# Patient Record
Sex: FEMALE | Race: WHITE | NOT HISPANIC OR LATINO | URBAN - METROPOLITAN AREA
[De-identification: names, ages, dates, MRNs, and addresses within clinical notes are randomized per-mention and may not be internally consistent; named-entity substitution may affect disease eponyms.]

---

## 2017-09-12 ENCOUNTER — NEW PATIENT (OUTPATIENT)
Dept: URBAN - METROPOLITAN AREA CLINIC 24 | Facility: CLINIC | Age: 57
End: 2017-09-12
Payer: COMMERCIAL

## 2017-09-12 DIAGNOSIS — H47.323 DRUSEN OF OPTIC DISC, BILATERAL: ICD-10-CM

## 2017-09-12 DIAGNOSIS — H25.813 COMBINED FORMS OF AGE-RELATED CATARACT, BILATERAL: ICD-10-CM

## 2017-09-12 PROCEDURE — 92020 GONIOSCOPY: CPT | Performed by: OPTOMETRIST

## 2017-09-12 PROCEDURE — 92004 COMPRE OPH EXAM NEW PT 1/>: CPT | Performed by: OPTOMETRIST

## 2017-09-12 PROCEDURE — 92083 EXTENDED VISUAL FIELD XM: CPT | Performed by: OPTOMETRIST

## 2017-09-12 PROCEDURE — 92133 CPTRZD OPH DX IMG PST SGM ON: CPT | Performed by: OPTOMETRIST

## 2017-09-12 PROCEDURE — 92132 CPTRZD OPH DX IMG ANT SGM: CPT | Performed by: OPTOMETRIST

## 2017-09-12 RX ORDER — DICLOFENAC POTASSIUM 50 MG/1
50 MG TABLET, FILM COATED ORAL
Qty: 0 | Refills: 0 | Status: INACTIVE
Start: 2017-09-12 | End: 2017-11-10

## 2017-09-12 RX ORDER — SIMVASTATIN 10 MG/1
10 MG TABLET, FILM COATED ORAL
Qty: 0 | Refills: 0 | Status: INACTIVE
Start: 2017-09-12 | End: 2017-11-10

## 2017-09-12 RX ORDER — TIMOLOL MALEATE 10 MG/1
10 MG TABLET ORAL
Qty: 0 | Refills: 0 | Status: INACTIVE
Start: 2005-01-12 | End: 2017-08-14

## 2017-09-12 RX ORDER — TRAMADOL HYDROCHLORIDE 50 MG/1
50 MG TABLET, FILM COATED ORAL
Qty: 0 | Refills: 0 | Status: INACTIVE
Start: 2017-09-12 | End: 2017-11-10

## 2017-09-12 RX ORDER — LISINOPRIL 10 MG/1
10 MG TABLET ORAL
Qty: 0 | Refills: 0 | Status: INACTIVE
Start: 2017-09-12 | End: 2017-11-10

## 2017-09-12 ASSESSMENT — VISUAL ACUITY
OS: 20/20
OD: 20/20

## 2017-09-12 ASSESSMENT — KERATOMETRY
OD: 45.34
OS: 45.09

## 2017-09-12 ASSESSMENT — INTRAOCULAR PRESSURE
OS: 16
OD: 16

## 2017-10-11 ENCOUNTER — CONSULT (OUTPATIENT)
Dept: URBAN - METROPOLITAN AREA CLINIC 24 | Facility: CLINIC | Age: 57
End: 2017-10-11
Payer: COMMERCIAL

## 2017-10-11 PROCEDURE — 76514 ECHO EXAM OF EYE THICKNESS: CPT | Performed by: OPHTHALMOLOGY

## 2017-10-11 PROCEDURE — 92014 COMPRE OPH EXAM EST PT 1/>: CPT | Performed by: OPHTHALMOLOGY

## 2017-10-11 PROCEDURE — 92020 GONIOSCOPY: CPT | Performed by: OPHTHALMOLOGY

## 2017-10-11 PROCEDURE — 92250 FUNDUS PHOTOGRAPHY W/I&R: CPT | Performed by: OPHTHALMOLOGY

## 2017-10-11 RX ORDER — PREDNISOLONE ACETATE 10 MG/ML
1 % SUSPENSION/ DROPS OPHTHALMIC
Qty: 1 | Refills: 0 | Status: INACTIVE
Start: 2017-10-11 | End: 2017-11-10

## 2017-10-11 ASSESSMENT — INTRAOCULAR PRESSURE
OS: 23
OD: 24

## 2017-11-10 ENCOUNTER — Encounter (OUTPATIENT)
Dept: URBAN - METROPOLITAN AREA CLINIC 24 | Facility: CLINIC | Age: 57
End: 2017-11-10
Payer: COMMERCIAL

## 2017-11-10 DIAGNOSIS — Z01.818 ENCOUNTER FOR OTHER PREPROCEDURAL EXAMINATION: Primary | ICD-10-CM

## 2017-11-10 DIAGNOSIS — H40.033 ANATOMICAL NARROW ANGLE, BILATERAL: ICD-10-CM

## 2017-11-10 PROCEDURE — 99213 OFFICE O/P EST LOW 20 MIN: CPT | Performed by: NURSE PRACTITIONER

## 2017-11-10 RX ORDER — TIZANIDINE HYDROCHLORIDE 2 MG/1
2 MG CAPSULE ORAL
Qty: 0 | Refills: 0 | Status: ACTIVE
Start: 2017-11-10

## 2017-11-10 RX ORDER — SIMVASTATIN 20 MG/1
20 MG TABLET, FILM COATED ORAL
Qty: 0 | Refills: 0 | Status: INACTIVE
Start: 2017-11-10 | End: 2018-04-04

## 2017-11-10 RX ORDER — NITROFURANTOIN (MONOHYDRATE/MACROCRYSTALS) 75; 25 MG/1; MG/1
100 MG CAPSULE ORAL
Qty: 0 | Refills: 0 | Status: ACTIVE
Start: 2017-11-10

## 2017-11-10 RX ORDER — GABAPENTIN 300 MG/1
300 MG CAPSULE ORAL
Qty: 0 | Refills: 0 | Status: ACTIVE
Start: 2017-11-10

## 2017-11-10 RX ORDER — DICLOFENAC POTASSIUM 50 MG/1
50 MG TABLET, FILM COATED ORAL
Qty: 0 | Refills: 0 | Status: ACTIVE
Start: 2017-11-10

## 2017-11-10 RX ORDER — PREDNISOLONE ACETATE 10 MG/ML
1 % SUSPENSION/ DROPS OPHTHALMIC
Qty: 1 | Refills: 0 | Status: INACTIVE
Start: 2017-11-10 | End: 2017-11-29

## 2017-11-10 RX ORDER — CHOLECALCIFEROL (VITAMIN D3) 50 MCG
CAPSULE ORAL
Qty: 0 | Refills: 0 | Status: ACTIVE
Start: 2017-11-10

## 2017-11-10 RX ORDER — LISINOPRIL 10 MG/1
10 MG TABLET ORAL
Qty: 0 | Refills: 0 | Status: INACTIVE
Start: 2017-11-10 | End: 2018-04-04

## 2017-11-15 ENCOUNTER — SURGERY (OUTPATIENT)
Dept: URBAN - METROPOLITAN AREA SURGERY 12 | Facility: SURGERY | Age: 57
End: 2017-11-15
Payer: COMMERCIAL

## 2017-11-15 PROCEDURE — 66761 REVISION OF IRIS: CPT | Performed by: OPHTHALMOLOGY

## 2017-11-29 ENCOUNTER — SURGERY (OUTPATIENT)
Dept: URBAN - METROPOLITAN AREA SURGERY 12 | Facility: SURGERY | Age: 57
End: 2017-11-29
Payer: COMMERCIAL

## 2017-11-29 PROCEDURE — 66761 REVISION OF IRIS: CPT | Performed by: OPHTHALMOLOGY

## 2017-11-29 RX ORDER — PREDNISOLONE ACETATE 10 MG/ML
1 % SUSPENSION/ DROPS OPHTHALMIC
Qty: 1 | Refills: 0 | Status: INACTIVE
Start: 2017-11-29 | End: 2018-01-02

## 2018-01-02 ENCOUNTER — FOLLOW UP ESTABLISHED (OUTPATIENT)
Dept: URBAN - METROPOLITAN AREA CLINIC 24 | Facility: CLINIC | Age: 58
End: 2018-01-02
Payer: COMMERCIAL

## 2018-01-02 PROCEDURE — 92012 INTRM OPH EXAM EST PATIENT: CPT | Performed by: OPHTHALMOLOGY

## 2018-01-02 RX ORDER — LATANOPROST 50 UG/ML
0.005 % SOLUTION OPHTHALMIC
Qty: 3 | Refills: 3 | Status: INACTIVE
Start: 2018-01-02 | End: 2018-11-26

## 2018-01-02 ASSESSMENT — INTRAOCULAR PRESSURE
OD: 24
OS: 24

## 2018-04-04 ENCOUNTER — FOLLOW UP ESTABLISHED (OUTPATIENT)
Dept: URBAN - METROPOLITAN AREA CLINIC 26 | Facility: CLINIC | Age: 58
End: 2018-04-04
Payer: COMMERCIAL

## 2018-04-04 PROCEDURE — 92012 INTRM OPH EXAM EST PATIENT: CPT | Performed by: OPHTHALMOLOGY

## 2018-04-04 ASSESSMENT — INTRAOCULAR PRESSURE
OD: 14
OS: 14

## 2019-08-05 ENCOUNTER — FOLLOW UP ESTABLISHED (OUTPATIENT)
Dept: URBAN - METROPOLITAN AREA CLINIC 26 | Facility: CLINIC | Age: 59
End: 2019-08-05
Payer: COMMERCIAL

## 2019-08-05 DIAGNOSIS — H43.812 VITREOUS DEGENERATION, LEFT EYE: Primary | ICD-10-CM

## 2019-08-05 PROCEDURE — 92134 CPTRZ OPH DX IMG PST SGM RTA: CPT | Performed by: OPTOMETRIST

## 2019-08-05 PROCEDURE — 92014 COMPRE OPH EXAM EST PT 1/>: CPT | Performed by: OPTOMETRIST

## 2019-08-05 ASSESSMENT — INTRAOCULAR PRESSURE
OS: 15
OD: 15

## 2023-01-06 ENCOUNTER — OFFICE VISIT (OUTPATIENT)
Dept: URBAN - METROPOLITAN AREA CLINIC 26 | Facility: CLINIC | Age: 63
End: 2023-01-06
Payer: COMMERCIAL

## 2023-01-06 DIAGNOSIS — H57.13 BILATERAL OCULAR PAIN: Primary | ICD-10-CM

## 2023-01-06 DIAGNOSIS — H40.033 ANATOMICAL NARROW ANGLE, BILATERAL: ICD-10-CM

## 2023-01-06 PROCEDURE — 99203 OFFICE O/P NEW LOW 30 MIN: CPT | Performed by: OPTOMETRIST

## 2023-01-06 RX ORDER — LATANOPROST 50 UG/ML
0.005 % SOLUTION OPHTHALMIC
Qty: 7.5 | Refills: 3 | Status: ACTIVE
Start: 2023-01-06

## 2023-01-06 ASSESSMENT — INTRAOCULAR PRESSURE
OD: 15
OS: 15

## 2023-01-06 NOTE — IMPRESSION/PLAN
Impression: Anatomical narrow angle, bilateral Plan: s/p lpi OU. 
currently taking latanoprost qhs ou. IOP today 15/15
pt ed of need of consistent f/u to evaluate if latanoprost if effective. rtc 1 month complete exam, vf 24-2 and rnfl oct.

## 2023-01-06 NOTE — IMPRESSION/PLAN
Impression: Bilateral ocular pain: H57.13. Plan: c/o constant pressure around eyes. no ocular etiology observed to account for pt complaint. pt reassurance IOP stable. recommend consult with PCP and potential sinus evaluation 2/2 pressure sensation.

## 2023-02-06 ENCOUNTER — OFFICE VISIT (OUTPATIENT)
Dept: URBAN - METROPOLITAN AREA CLINIC 26 | Facility: CLINIC | Age: 63
End: 2023-02-06
Payer: COMMERCIAL

## 2023-02-06 DIAGNOSIS — H25.813 COMBINED FORMS OF AGE-RELATED CATARACT, BILATERAL: ICD-10-CM

## 2023-02-06 DIAGNOSIS — H40.033 ANATOMICAL NARROW ANGLE, BILATERAL: Primary | ICD-10-CM

## 2023-02-06 DIAGNOSIS — H47.323 DRUSEN OF OPTIC DISC, BILATERAL: ICD-10-CM

## 2023-02-06 DIAGNOSIS — H16.223 KERATOCONJUNCTIVITIS SICCA, BILATERAL: ICD-10-CM

## 2023-02-06 DIAGNOSIS — H43.812 VITREOUS DEGENERATION, LEFT EYE: ICD-10-CM

## 2023-02-06 PROCEDURE — 92083 EXTENDED VISUAL FIELD XM: CPT | Performed by: OPTOMETRIST

## 2023-02-06 PROCEDURE — 92014 COMPRE OPH EXAM EST PT 1/>: CPT | Performed by: OPTOMETRIST

## 2023-02-06 PROCEDURE — 92133 CPTRZD OPH DX IMG PST SGM ON: CPT | Performed by: OPTOMETRIST

## 2023-02-06 ASSESSMENT — INTRAOCULAR PRESSURE
OD: 11
OS: 11

## 2023-02-06 ASSESSMENT — KERATOMETRY
OD: 45.50
OS: 45.00

## 2023-02-06 ASSESSMENT — VISUAL ACUITY
OS: 20/20
OD: 20/20

## 2023-02-06 NOTE — IMPRESSION/PLAN
Impression: Drusen of optic disc, bilateral
-- Pt referred Loni Aguilera, OD for optic nerve eval
-- see rnfl oct Plan: questionable change to vf 2/2 glaucomatous changes vs disc drusen. observe.

## 2023-02-06 NOTE — IMPRESSION/PLAN
Impression: Keratoconjunctivitis sicca, bilateral: C23.255. Plan: Recommend artificial tears at least 4 times a day and gel drop or tear ointment at bedtime, Omega 3 fatty acids (2-3,000 mg) daily. drink plenty water. Avoid overhead fans at bedtime.

## 2023-02-06 NOTE — IMPRESSION/PLAN
Impression: Anatomical narrow angle, bilateral
--(+) optic disc drusen OU Plan: s/p lpi OU. 
currently taking latanoprost qhs ou.
today's IOP: 11/11 rnfl oct (02/06/23): od;superior and inferior borderline os: inferior thinning
vf 24-2 (0/06/23) od: superior nasal defect os: superior and inferior arcuate (glaucomatous progression vs. disc drusen?) IOP well controlled with current medication.  
continue latanoprost qhs OU. 
rtc 6 months IOP check

## 2023-08-07 ENCOUNTER — OFFICE VISIT (OUTPATIENT)
Dept: URBAN - METROPOLITAN AREA CLINIC 26 | Facility: CLINIC | Age: 63
End: 2023-08-07
Payer: COMMERCIAL

## 2023-08-07 DIAGNOSIS — H40.033 ANATOMICAL NARROW ANGLE, BILATERAL: Primary | ICD-10-CM

## 2023-08-07 PROCEDURE — 99213 OFFICE O/P EST LOW 20 MIN: CPT | Performed by: OPTOMETRIST

## 2023-08-07 ASSESSMENT — INTRAOCULAR PRESSURE
OD: 11
OS: 11

## 2024-02-07 ENCOUNTER — OFFICE VISIT (OUTPATIENT)
Dept: URBAN - METROPOLITAN AREA CLINIC 26 | Facility: CLINIC | Age: 64
End: 2024-02-07
Payer: COMMERCIAL

## 2024-02-07 DIAGNOSIS — H43.812 VITREOUS DEGENERATION, LEFT EYE: ICD-10-CM

## 2024-02-07 DIAGNOSIS — H40.033 ANATOMICAL NARROW ANGLE, BILATERAL: ICD-10-CM

## 2024-02-07 DIAGNOSIS — H47.323 DRUSEN OF OPTIC DISC, BILATERAL: ICD-10-CM

## 2024-02-07 DIAGNOSIS — H16.223 KERATOCONJUNCTIVITIS SICCA, BILATERAL: Primary | ICD-10-CM

## 2024-02-07 DIAGNOSIS — H25.813 COMBINED FORMS OF AGE-RELATED CATARACT, BILATERAL: ICD-10-CM

## 2024-02-07 PROCEDURE — 92014 COMPRE OPH EXAM EST PT 1/>: CPT | Performed by: OPTOMETRIST

## 2024-02-07 PROCEDURE — 92083 EXTENDED VISUAL FIELD XM: CPT | Performed by: OPTOMETRIST

## 2024-02-07 PROCEDURE — 92134 CPTRZ OPH DX IMG PST SGM RTA: CPT | Performed by: OPTOMETRIST

## 2024-02-07 PROCEDURE — 92133 CPTRZD OPH DX IMG PST SGM ON: CPT | Performed by: OPTOMETRIST

## 2024-02-07 ASSESSMENT — VISUAL ACUITY
OD: 20/20
OS: 20/20

## 2024-02-07 ASSESSMENT — INTRAOCULAR PRESSURE
OS: 11
OD: 11

## 2024-02-07 ASSESSMENT — KERATOMETRY
OD: 45.63
OS: 45.25